# Patient Record
Sex: MALE | Race: WHITE | NOT HISPANIC OR LATINO | Employment: OTHER | URBAN - NONMETROPOLITAN AREA
[De-identification: names, ages, dates, MRNs, and addresses within clinical notes are randomized per-mention and may not be internally consistent; named-entity substitution may affect disease eponyms.]

---

## 2023-12-05 ENCOUNTER — HOSPITAL ENCOUNTER (EMERGENCY)
Facility: HOSPITAL | Age: 28
Discharge: HOME | End: 2023-12-05
Attending: EMERGENCY MEDICINE
Payer: MEDICAID

## 2023-12-05 VITALS
HEIGHT: 71 IN | RESPIRATION RATE: 16 BRPM | SYSTOLIC BLOOD PRESSURE: 150 MMHG | WEIGHT: 155 LBS | HEART RATE: 108 BPM | OXYGEN SATURATION: 96 % | TEMPERATURE: 98.1 F | BODY MASS INDEX: 21.7 KG/M2 | DIASTOLIC BLOOD PRESSURE: 96 MMHG

## 2023-12-05 DIAGNOSIS — K62.5 BRBPR (BRIGHT RED BLOOD PER RECTUM): Primary | ICD-10-CM

## 2023-12-05 PROCEDURE — 99281 EMR DPT VST MAYX REQ PHY/QHP: CPT | Performed by: EMERGENCY MEDICINE

## 2023-12-05 ASSESSMENT — COLUMBIA-SUICIDE SEVERITY RATING SCALE - C-SSRS
2. HAVE YOU ACTUALLY HAD ANY THOUGHTS OF KILLING YOURSELF?: NO
1. IN THE PAST MONTH, HAVE YOU WISHED YOU WERE DEAD OR WISHED YOU COULD GO TO SLEEP AND NOT WAKE UP?: NO
6. HAVE YOU EVER DONE ANYTHING, STARTED TO DO ANYTHING, OR PREPARED TO DO ANYTHING TO END YOUR LIFE?: NO

## 2023-12-05 ASSESSMENT — PAIN SCALES - GENERAL: PAINLEVEL_OUTOF10: 0 - NO PAIN

## 2023-12-05 ASSESSMENT — PAIN - FUNCTIONAL ASSESSMENT: PAIN_FUNCTIONAL_ASSESSMENT: 0-10

## 2023-12-05 NOTE — ED PROVIDER NOTES
HPI   Chief Complaint   Patient presents with    Rectal Bleeding     Pt states he had a bowel movement this morning and noticed blood in the toilet and on the paper when he wiped. Pt denies any pain and states that BM was not firm       HPI                    [place] Coma Scale Score: 15                  Patient History   No past medical history on file.  No past surgical history on file.  No family history on file.  Social History     Tobacco Use    Smoking status: Not on file    Smokeless tobacco: Not on file   Substance Use Topics    Alcohol use: Not on file    Drug use: Not on file       Physical Exam   ED Triage Vitals [12/05/23 0946]   Temp Heart Rate Resp BP   36.7 °C (98.1 °F) 108 16 (!) 150/96      SpO2 Temp Source Heart Rate Source Patient Position   96 % Temporal Monitor Sitting      BP Location FiO2 (%)     Right arm --       Physical Exam  Constitutional:       General: He is not in acute distress.     Appearance: Normal appearance. He is not toxic-appearing.   HENT:      Head: Normocephalic and atraumatic.      Right Ear: Tympanic membrane normal.      Left Ear: Tympanic membrane normal.      Mouth/Throat:      Mouth: Mucous membranes are moist.      Pharynx: Oropharynx is clear.   Eyes:      Conjunctiva/sclera: Conjunctivae normal.      Pupils: Pupils are equal, round, and reactive to light.   Cardiovascular:      Rate and Rhythm: Normal rate and regular rhythm.      Pulses: Normal pulses.      Heart sounds: Normal heart sounds.   Pulmonary:      Effort: Pulmonary effort is normal. No respiratory distress.      Breath sounds: Normal breath sounds. No wheezing.   Abdominal:      General: Bowel sounds are normal.      Palpations: Abdomen is soft.      Tenderness: There is no abdominal tenderness. There is no guarding or rebound.   Musculoskeletal:         General: Normal range of motion.      Cervical back: Normal range of motion.   Skin:     General: Skin is warm and dry.   Neurological:      General:  No focal deficit present.      Mental Status: He is alert and oriented to person, place, and time.         ED Course & MDM   Diagnoses as of 12/05/23 0956   BRBPR (bright red blood per rectum)       Medical Decision Making  28-year-old male presents to the ER with chief complaint of bright red blood per rectum.  Patient reports he has been having diarrhea for the past 4 to 5 days when he wiped he felt blood was very concerned and came to the ED.  Patient's vital signs are stable patient has no lightheadedness or dizziness.  No systemic symptoms.  I explained to the patient it is common to have bright red blood per rectum when you wipe and it is very rarely anything concerning.  Patient denies any rectal pain at all.  Patient be discharged home to follow-up with GI for further evaluation and potential endoscope.        Procedure  Procedures     Francesco Zuleta, DO  12/05/23 0956

## 2024-08-09 ENCOUNTER — HOSPITAL ENCOUNTER (EMERGENCY)
Facility: HOSPITAL | Age: 29
Discharge: HOME | End: 2024-08-09
Attending: EMERGENCY MEDICINE
Payer: MEDICAID

## 2024-08-09 VITALS
TEMPERATURE: 97.8 F | RESPIRATION RATE: 16 BRPM | HEART RATE: 83 BPM | DIASTOLIC BLOOD PRESSURE: 84 MMHG | BODY MASS INDEX: 22.4 KG/M2 | WEIGHT: 160 LBS | OXYGEN SATURATION: 98 % | HEIGHT: 71 IN | SYSTOLIC BLOOD PRESSURE: 143 MMHG

## 2024-08-09 DIAGNOSIS — K64.9 BLEEDING HEMORRHOID: Primary | ICD-10-CM

## 2024-08-09 PROCEDURE — 99281 EMR DPT VST MAYX REQ PHY/QHP: CPT

## 2024-08-09 ASSESSMENT — COLUMBIA-SUICIDE SEVERITY RATING SCALE - C-SSRS
2. HAVE YOU ACTUALLY HAD ANY THOUGHTS OF KILLING YOURSELF?: NO
6. HAVE YOU EVER DONE ANYTHING, STARTED TO DO ANYTHING, OR PREPARED TO DO ANYTHING TO END YOUR LIFE?: NO
1. IN THE PAST MONTH, HAVE YOU WISHED YOU WERE DEAD OR WISHED YOU COULD GO TO SLEEP AND NOT WAKE UP?: NO

## 2024-08-09 ASSESSMENT — PAIN - FUNCTIONAL ASSESSMENT
PAIN_FUNCTIONAL_ASSESSMENT: 0-10
PAIN_FUNCTIONAL_ASSESSMENT: 0-10

## 2024-08-09 ASSESSMENT — PAIN SCALES - GENERAL
PAINLEVEL_OUTOF10: 0 - NO PAIN
PAINLEVEL_OUTOF10: 0 - NO PAIN

## 2024-08-09 ASSESSMENT — PAIN DESCRIPTION - PROGRESSION: CLINICAL_PROGRESSION: NOT CHANGED

## 2024-08-09 NOTE — ED TRIAGE NOTES
Pt here with complaints of a hard ball on his butthole. Says he noticed it yesterday while in the shower and it has been bleeding. Denies pain.

## 2024-08-09 NOTE — Clinical Note
Bharathi Conway was seen and treated in our emergency department on 8/9/2024.  He may return to work on 08/10/2024.  No heavy lifting of anything over 10 lbs for the next 24 hours.     If you have any questions or concerns, please don't hesitate to call.      Nika MANLEY MD

## 2024-08-09 NOTE — ED PROVIDER NOTES
HPI   Chief Complaint   Patient presents with    Abscess     Pt here with complaints of a hard ball on his butthole. Says he noticed it yesterday while in the shower and it has been bleeding. Denies pain.       HPI  Patient is a 29-year-old male presenting to the ED today for bright red blood per rectum.  Patient notes that last night while in the shower, he noticed a lump in his rectum.  After having a bowel movement last night, he noticed bright red blood around his stool.  Since then, he states that he has continued to have bright red blood per rectum whenever he wipes with the toilet paper.  As he continued to have bright red blood this morning with wiping, he came to the ED for further evaluation.  He otherwise denies any associated pain.  He does report previous history of anal fissure.  He denies any dizziness, lightheadedness, or generalized weakness.  He is not on any anticoagulation and has no other past medical history.      Patient History   History reviewed. No pertinent past medical history.  History reviewed. No pertinent surgical history.  No family history on file.  Social History     Tobacco Use    Smoking status: Never    Smokeless tobacco: Never   Substance Use Topics    Alcohol use: Never    Drug use: Never       Physical Exam   ED Triage Vitals [08/09/24 0808]   Temperature Heart Rate Respirations BP   36.6 °C (97.8 °F) 83 16 143/84      Pulse Ox Temp Source Heart Rate Source Patient Position   98 % Temporal Monitor --      BP Location FiO2 (%)     -- --       Physical Exam  Vitals and nursing note reviewed.   Constitutional:       General: He is not in acute distress.     Appearance: He is not toxic-appearing.   HENT:      Head: Normocephalic.      Mouth/Throat:      Mouth: Mucous membranes are moist.   Eyes:      Extraocular Movements: Extraocular movements intact.      Conjunctiva/sclera: Conjunctivae normal.   Cardiovascular:      Rate and Rhythm: Normal rate and regular rhythm.    Pulmonary:      Effort: Pulmonary effort is normal. No respiratory distress.   Abdominal:      General: There is no distension.      Palpations: Abdomen is soft.      Tenderness: There is no abdominal tenderness.   Genitourinary:     Comments: Performed with DILCIA Meyers at bedside. External hemorrhoid present, no active bleeding at this time. Hemorrhoid not thrombosed  Musculoskeletal:         General: No swelling.      Cervical back: Neck supple.   Skin:     General: Skin is warm and dry.      Capillary Refill: Capillary refill takes less than 2 seconds.   Neurological:      General: No focal deficit present.      Mental Status: He is alert. Mental status is at baseline.           ED Course & MDM   Diagnoses as of 08/09/24 0832   Bleeding hemorrhoid             No data recorded     [place] Coma Scale Score: 15 (08/09/24 0820 : Mira Holt RN)                       Medical Decision Making  Patient is a 29-year-old male presenting to the ED today for bright red blood per rectum.  On exam, patient has an external hemorrhoid that is not currently actively bleeding.  Hemorrhoid itself is not thrombosed.  Patient is hemodynamically stable, not tachycardic or hypotensive.  He is not on any anticoagulation.  All questions and concerns were answered. Discharge planning with close outpatient follow-up was discussed at this time, to which the patient was agreeable. Patient is provided with PCP options as well as general surgery contact info for follow-up. Home care instructions and strict return precautions were given, and patient was discharged home in stable condition.    Procedure  Procedures     Nika MANLEY MD  08/09/24 08

## 2024-08-23 ENCOUNTER — PHARMACY VISIT (OUTPATIENT)
Dept: PHARMACY | Facility: CLINIC | Age: 29
End: 2024-08-23
Payer: MEDICAID

## 2024-08-23 ENCOUNTER — HOSPITAL ENCOUNTER (EMERGENCY)
Facility: HOSPITAL | Age: 29
Discharge: HOME | End: 2024-08-23
Payer: MEDICAID

## 2024-08-23 VITALS
RESPIRATION RATE: 18 BRPM | BODY MASS INDEX: 22.4 KG/M2 | TEMPERATURE: 97.3 F | WEIGHT: 160 LBS | HEART RATE: 94 BPM | OXYGEN SATURATION: 97 % | DIASTOLIC BLOOD PRESSURE: 79 MMHG | HEIGHT: 71 IN | SYSTOLIC BLOOD PRESSURE: 122 MMHG

## 2024-08-23 DIAGNOSIS — J06.9 UPPER RESPIRATORY TRACT INFECTION, UNSPECIFIED TYPE: Primary | ICD-10-CM

## 2024-08-23 LAB
FLUAV RNA RESP QL NAA+PROBE: NOT DETECTED
FLUBV RNA RESP QL NAA+PROBE: NOT DETECTED
SARS-COV-2 RNA RESP QL NAA+PROBE: NOT DETECTED

## 2024-08-23 PROCEDURE — 87636 SARSCOV2 & INF A&B AMP PRB: CPT | Performed by: PHYSICIAN ASSISTANT

## 2024-08-23 PROCEDURE — 99283 EMERGENCY DEPT VISIT LOW MDM: CPT

## 2024-08-23 PROCEDURE — RXMED WILLOW AMBULATORY MEDICATION CHARGE

## 2024-08-23 RX ORDER — MELOXICAM 7.5 MG/1
7.5 TABLET ORAL 2 TIMES DAILY
Qty: 20 TABLET | Refills: 0 | Status: SHIPPED | OUTPATIENT
Start: 2024-08-23 | End: 2024-09-02

## 2024-08-23 RX ORDER — METOCLOPRAMIDE 10 MG/1
10 TABLET ORAL 3 TIMES DAILY
Qty: 21 TABLET | Refills: 0 | Status: SHIPPED | OUTPATIENT
Start: 2024-08-23 | End: 2024-08-30

## 2024-08-23 RX ORDER — DIPHENHYDRAMINE HCL 25 MG
25 CAPSULE ORAL EVERY 8 HOURS PRN
Qty: 30 CAPSULE | Refills: 0 | Status: SHIPPED | OUTPATIENT
Start: 2024-08-23

## 2024-08-23 ASSESSMENT — PAIN DESCRIPTION - LOCATION: LOCATION: BACK

## 2024-08-23 ASSESSMENT — COLUMBIA-SUICIDE SEVERITY RATING SCALE - C-SSRS
1. IN THE PAST MONTH, HAVE YOU WISHED YOU WERE DEAD OR WISHED YOU COULD GO TO SLEEP AND NOT WAKE UP?: NO
6. HAVE YOU EVER DONE ANYTHING, STARTED TO DO ANYTHING, OR PREPARED TO DO ANYTHING TO END YOUR LIFE?: NO
2. HAVE YOU ACTUALLY HAD ANY THOUGHTS OF KILLING YOURSELF?: NO

## 2024-08-23 ASSESSMENT — PAIN DESCRIPTION - PAIN TYPE: TYPE: ACUTE PAIN

## 2024-08-23 ASSESSMENT — PAIN SCALES - GENERAL
PAINLEVEL_OUTOF10: 0 - NO PAIN
PAINLEVEL_OUTOF10: 0 - NO PAIN
PAINLEVEL_OUTOF10: 3

## 2024-08-23 ASSESSMENT — PAIN - FUNCTIONAL ASSESSMENT: PAIN_FUNCTIONAL_ASSESSMENT: 0-10

## 2024-08-23 NOTE — ED TRIAGE NOTES
Pt arrives to [facility] presenting with flu-like symptoms, pt states he has had cold sweats that have been ongoing for four days. Today, he took a home COVID test and it came back negative. Pt A&Ox4, resp easy and unlabored, skin of appropriate color.

## 2024-08-23 NOTE — ED PROVIDER NOTES
HPI   Chief Complaint   Patient presents with    Flu Symptoms     Pt arrives to [facility] presenting with flu-like symptoms, pt states he has had cold sweats that have been ongoing for four days. Today, he took a home COVID test and it came back negative. Pt A&Ox4, resp easy and unlabored, skin of appropriate color.        History of present illness:  29-year-old male presents emergency room for complaints of bodyaches, chills, cough and headache x 4 days.  Patient states he has felt this very tired and weak and been having the aforementioned symptoms the past 4 days.  He does not recall any sick contacts and states he has no previous medical history.  He denies any injuries or falls or trauma.  He states he feels like he has COVID but he took a home COVID test which was negative.  He states he has no other symptoms at this time and denies any other symptoms.  Upon further questioning he mentions he has had some bouts of diarrhea though and states has been very infrequent.  He denies any abdominal pain at this time or nausea or vomiting or difficulty eating or drinking.    Social history: Negative for alcohol and drug use.    Review of systems:   Gen.: No weight loss,  Eyes: No vision loss, double vision  ENT: No pharyngitis, neck pain  Cardiac: No chest pain, palpitations, syncope  Pulmonary: No shortness of breath,  hemoptysis.   Heme/lymph: No swollen glands, bleeding.   GI: No abdominal pain, change in bowel habits, melena, hematemesis, hematochezia, nausea, vomiting  : No discharge, dysuria, frequency, urgency, hematuria.   Musculoskeletal: No joint swelling.   Skin: No rashes.   Review of systems is otherwise negative unless stated above or in history of present illness.    Physical exam:  General: Vitals noted, no distress. Afebrile.   EENT: No lymphadenopathy appreciated   Cardiac: Regular, rate, rhythm, no murmur.   Pulmonary: Lungs clear bilaterally with good aeration. No adventitious breath sounds.   Abdomen:  Soft, nonsurgical. Nontender. No peritoneal signs. Normoactive bowel sounds.   Extremities: No peripheral edema.   Skin: No rash.   Neuro: No focal neurologic deficits      Medical decision making:   Testing: COVID testing influenza testing negative  Plan: Home-going.  Discussed differential. Will follow-up with the primary physician in the next 2-3 days. Return if worse. They understand return precautions and discharge instructions. Patient and family/friend/caregiver are in agreement with this plan. 29-year-old male presents emergency room for complaints of bodyaches, chills, cough and headache x 4 days.  Patient states he has felt this very tired and weak and been having the aforementioned symptoms the past 4 days.  He does not recall any sick contacts and states he has no previous medical history.  He denies any injuries or falls or trauma.  He states he feels like he has COVID but he took a home COVID test which was negative.  He states he has no other symptoms at this time and denies any other symptoms.  Upon further questioning he mentions he has had some bouts of diarrhea though and states has been very infrequent.  He denies any abdominal pain at this time or nausea or vomiting or difficulty eating or drinking. EENT: No lymphadenopathy appreciated   Cardiac: Regular, rate, rhythm, no murmur. Pulmonary: Lungs clear bilaterally with good aeration. No adventitious breath sounds.   Abdomen: Soft, nonsurgical. Nontender. No peritoneal signs. Normoactive bowel sounds.  I explained to the patient the test results at this time and offered him medications at this time.  The patient just wanted something for his headache and I explained to him I be happy send him home with meloxicam Reglan Benadryl typically alleviate some of his symptoms.  I encouraged him to follow-up with his primary care doctor and encouraged him return the event that any worsening symptoms.     Impression:   1.  Headache  2.   URI          History provided by:  Patient   used: No            Patient History   No past medical history on file.  No past surgical history on file.  No family history on file.  Social History     Tobacco Use    Smoking status: Never    Smokeless tobacco: Never   Substance Use Topics    Alcohol use: Never    Drug use: Never       Physical Exam   ED Triage Vitals [08/23/24 1340]   Temperature Heart Rate Respirations BP   36.3 °C (97.3 °F) 97 18 126/88      Pulse Ox Temp Source Heart Rate Source Patient Position   97 % Temporal Monitor --      BP Location FiO2 (%)     -- --       Physical Exam      ED Course & MDM   Diagnoses as of 08/23/24 1443   Upper respiratory tract infection, unspecified type                 No data recorded                                 Medical Decision Making      Procedure  Procedures     Bharathi Rucker PA-C  08/23/24 9584

## 2025-07-17 ENCOUNTER — PHARMACY VISIT (OUTPATIENT)
Dept: PHARMACY | Facility: CLINIC | Age: 30
End: 2025-07-17
Payer: MEDICAID

## 2025-07-17 ENCOUNTER — APPOINTMENT (OUTPATIENT)
Dept: CARDIOLOGY | Facility: HOSPITAL | Age: 30
End: 2025-07-17
Payer: MEDICAID

## 2025-07-17 ENCOUNTER — APPOINTMENT (OUTPATIENT)
Dept: RADIOLOGY | Facility: HOSPITAL | Age: 30
End: 2025-07-17
Payer: MEDICAID

## 2025-07-17 ENCOUNTER — HOSPITAL ENCOUNTER (EMERGENCY)
Facility: HOSPITAL | Age: 30
Discharge: HOME | End: 2025-07-17
Attending: EMERGENCY MEDICINE
Payer: MEDICAID

## 2025-07-17 VITALS
HEART RATE: 95 BPM | HEIGHT: 68 IN | BODY MASS INDEX: 25.01 KG/M2 | DIASTOLIC BLOOD PRESSURE: 70 MMHG | TEMPERATURE: 98.2 F | WEIGHT: 165 LBS | OXYGEN SATURATION: 100 % | SYSTOLIC BLOOD PRESSURE: 114 MMHG | RESPIRATION RATE: 15 BRPM

## 2025-07-17 DIAGNOSIS — R11.10 POST-TUSSIVE EMESIS: ICD-10-CM

## 2025-07-17 DIAGNOSIS — J20.9 ACUTE BRONCHITIS, UNSPECIFIED ORGANISM: Primary | ICD-10-CM

## 2025-07-17 DIAGNOSIS — R10.9 ABDOMINAL PAIN, UNSPECIFIED ABDOMINAL LOCATION: ICD-10-CM

## 2025-07-17 DIAGNOSIS — R05.1 ACUTE COUGH: ICD-10-CM

## 2025-07-17 LAB
ALBUMIN SERPL BCP-MCNC: 4.5 G/DL (ref 3.4–5)
ALP SERPL-CCNC: 76 U/L (ref 33–120)
ALT SERPL W P-5'-P-CCNC: 23 U/L (ref 10–52)
ANION GAP SERPL CALC-SCNC: 11 MMOL/L (ref 10–20)
AST SERPL W P-5'-P-CCNC: 24 U/L (ref 9–39)
ATRIAL RATE: 107 BPM
ATRIAL RATE: 99 BPM
BASOPHILS # BLD AUTO: 0.02 X10*3/UL (ref 0–0.1)
BASOPHILS NFR BLD AUTO: 0.2 %
BILIRUB SERPL-MCNC: 1.2 MG/DL (ref 0–1.2)
BUN SERPL-MCNC: 10 MG/DL (ref 6–23)
CALCIUM SERPL-MCNC: 9.3 MG/DL (ref 8.6–10.3)
CARDIAC TROPONIN I PNL SERPL HS: 3 NG/L (ref 0–20)
CARDIAC TROPONIN I PNL SERPL HS: 3 NG/L (ref 0–20)
CHLORIDE SERPL-SCNC: 107 MMOL/L (ref 98–107)
CO2 SERPL-SCNC: 27 MMOL/L (ref 21–32)
CREAT SERPL-MCNC: 0.97 MG/DL (ref 0.5–1.3)
D DIMER PPP FEU-MCNC: 272 NG/ML FEU
EGFRCR SERPLBLD CKD-EPI 2021: >90 ML/MIN/1.73M*2
EOSINOPHIL # BLD AUTO: 0.31 X10*3/UL (ref 0–0.7)
EOSINOPHIL NFR BLD AUTO: 3.1 %
ERYTHROCYTE [DISTWIDTH] IN BLOOD BY AUTOMATED COUNT: 12.2 % (ref 11.5–14.5)
FLUAV RNA RESP QL NAA+PROBE: NOT DETECTED
FLUBV RNA RESP QL NAA+PROBE: NOT DETECTED
GLUCOSE SERPL-MCNC: 95 MG/DL (ref 74–99)
HCT VFR BLD AUTO: 40 % (ref 41–52)
HGB BLD-MCNC: 13.8 G/DL (ref 13.5–17.5)
IMM GRANULOCYTES # BLD AUTO: 0.04 X10*3/UL (ref 0–0.7)
IMM GRANULOCYTES NFR BLD AUTO: 0.4 % (ref 0–0.9)
LIPASE SERPL-CCNC: 7 U/L (ref 9–82)
LYMPHOCYTES # BLD AUTO: 1.76 X10*3/UL (ref 1.2–4.8)
LYMPHOCYTES NFR BLD AUTO: 17.8 %
MCH RBC QN AUTO: 31 PG (ref 26–34)
MCHC RBC AUTO-ENTMCNC: 34.5 G/DL (ref 32–36)
MCV RBC AUTO: 90 FL (ref 80–100)
MONOCYTES # BLD AUTO: 0.66 X10*3/UL (ref 0.1–1)
MONOCYTES NFR BLD AUTO: 6.7 %
NEUTROPHILS # BLD AUTO: 7.1 X10*3/UL (ref 1.2–7.7)
NEUTROPHILS NFR BLD AUTO: 71.8 %
NRBC BLD-RTO: 0 /100 WBCS (ref 0–0)
P AXIS: 34 DEGREES
P AXIS: 46 DEGREES
P OFFSET: 190 MS
P OFFSET: 201 MS
P ONSET: 145 MS
P ONSET: 147 MS
PLATELET # BLD AUTO: 261 X10*3/UL (ref 150–450)
POTASSIUM SERPL-SCNC: 3.2 MMOL/L (ref 3.5–5.3)
PR INTERVAL: 142 MS
PR INTERVAL: 148 MS
PROT SERPL-MCNC: 7.3 G/DL (ref 6.4–8.2)
Q ONSET: 218 MS
Q ONSET: 219 MS
QRS COUNT: 17 BEATS
QRS COUNT: 17 BEATS
QRS DURATION: 94 MS
QRS DURATION: 98 MS
QT INTERVAL: 342 MS
QT INTERVAL: 344 MS
QTC CALCULATION(BAZETT): 438 MS
QTC CALCULATION(BAZETT): 459 MS
QTC FREDERICIA: 404 MS
QTC FREDERICIA: 417 MS
R AXIS: 44 DEGREES
R AXIS: 58 DEGREES
RBC # BLD AUTO: 4.45 X10*6/UL (ref 4.5–5.9)
RSV RNA RESP QL NAA+PROBE: NOT DETECTED
SARS-COV-2 RNA RESP QL NAA+PROBE: NOT DETECTED
SODIUM SERPL-SCNC: 142 MMOL/L (ref 136–145)
T AXIS: 36 DEGREES
T AXIS: 39 DEGREES
T OFFSET: 389 MS
T OFFSET: 391 MS
VENTRICULAR RATE: 107 BPM
VENTRICULAR RATE: 99 BPM
WBC # BLD AUTO: 9.9 X10*3/UL (ref 4.4–11.3)

## 2025-07-17 PROCEDURE — 80053 COMPREHEN METABOLIC PANEL: CPT | Performed by: EMERGENCY MEDICINE

## 2025-07-17 PROCEDURE — RXMED WILLOW AMBULATORY MEDICATION CHARGE

## 2025-07-17 PROCEDURE — 96360 HYDRATION IV INFUSION INIT: CPT

## 2025-07-17 PROCEDURE — 36415 COLL VENOUS BLD VENIPUNCTURE: CPT | Performed by: EMERGENCY MEDICINE

## 2025-07-17 PROCEDURE — 87637 SARSCOV2&INF A&B&RSV AMP PRB: CPT | Performed by: EMERGENCY MEDICINE

## 2025-07-17 PROCEDURE — 99285 EMERGENCY DEPT VISIT HI MDM: CPT | Mod: 25 | Performed by: EMERGENCY MEDICINE

## 2025-07-17 PROCEDURE — 71045 X-RAY EXAM CHEST 1 VIEW: CPT | Mod: FOREIGN READ | Performed by: RADIOLOGY

## 2025-07-17 PROCEDURE — 2500000004 HC RX 250 GENERAL PHARMACY W/ HCPCS (ALT 636 FOR OP/ED): Mod: SE | Performed by: EMERGENCY MEDICINE

## 2025-07-17 PROCEDURE — 84484 ASSAY OF TROPONIN QUANT: CPT | Performed by: EMERGENCY MEDICINE

## 2025-07-17 PROCEDURE — 96361 HYDRATE IV INFUSION ADD-ON: CPT

## 2025-07-17 PROCEDURE — 71045 X-RAY EXAM CHEST 1 VIEW: CPT

## 2025-07-17 PROCEDURE — 93005 ELECTROCARDIOGRAM TRACING: CPT

## 2025-07-17 PROCEDURE — 85379 FIBRIN DEGRADATION QUANT: CPT | Performed by: EMERGENCY MEDICINE

## 2025-07-17 PROCEDURE — 83690 ASSAY OF LIPASE: CPT | Performed by: EMERGENCY MEDICINE

## 2025-07-17 PROCEDURE — 93005 ELECTROCARDIOGRAM TRACING: CPT | Mod: 76

## 2025-07-17 PROCEDURE — 85025 COMPLETE CBC W/AUTO DIFF WBC: CPT | Performed by: EMERGENCY MEDICINE

## 2025-07-17 RX ORDER — ALBUTEROL SULFATE 90 UG/1
2 INHALANT RESPIRATORY (INHALATION) EVERY 4 HOURS PRN
Qty: 18 G | Refills: 0 | Status: SHIPPED | OUTPATIENT
Start: 2025-07-17

## 2025-07-17 RX ORDER — AZITHROMYCIN 250 MG/1
TABLET, FILM COATED ORAL
Qty: 6 TABLET | Refills: 0 | Status: SHIPPED | OUTPATIENT
Start: 2025-07-17

## 2025-07-17 RX ORDER — BROMPHENIRAMINE MALEATE, PSEUDOEPHEDRINE HYDROCHLORIDE, AND DEXTROMETHORPHAN HYDROBROMIDE 2; 30; 10 MG/5ML; MG/5ML; MG/5ML
5 SYRUP ORAL 4 TIMES DAILY PRN
Qty: 118 ML | Refills: 0 | Status: SHIPPED | OUTPATIENT
Start: 2025-07-17 | End: 2025-07-27

## 2025-07-17 RX ORDER — BENZONATATE 100 MG/1
100 CAPSULE ORAL 3 TIMES DAILY PRN
Qty: 30 CAPSULE | Refills: 0 | Status: SHIPPED | OUTPATIENT
Start: 2025-07-17

## 2025-07-17 RX ORDER — METHYLPREDNISOLONE 4 MG/1
TABLET ORAL
Qty: 21 TABLET | Refills: 0 | Status: SHIPPED | OUTPATIENT
Start: 2025-07-17 | End: 2025-07-23

## 2025-07-17 RX ADMIN — SODIUM CHLORIDE 1000 ML: 0.9 INJECTION, SOLUTION INTRAVENOUS at 12:03

## 2025-07-17 ASSESSMENT — PAIN DESCRIPTION - PAIN TYPE: TYPE: ACUTE PAIN

## 2025-07-17 ASSESSMENT — PAIN SCALES - GENERAL
PAINLEVEL_OUTOF10: 5 - MODERATE PAIN
PAINLEVEL_OUTOF10: 5 - MODERATE PAIN

## 2025-07-17 ASSESSMENT — PAIN DESCRIPTION - LOCATION: LOCATION: CHEST

## 2025-07-17 ASSESSMENT — PAIN DESCRIPTION - DESCRIPTORS
DESCRIPTORS: PRESSURE
DESCRIPTORS: PRESSURE

## 2025-07-17 ASSESSMENT — PAIN - FUNCTIONAL ASSESSMENT: PAIN_FUNCTIONAL_ASSESSMENT: 0-10

## 2025-07-17 NOTE — DISCHARGE INSTRUCTIONS
Start the antibiotics (Z-Destin/azithromycin) on 7/19 if your symptoms do not improve with the other medications.

## 2025-07-17 NOTE — ED TRIAGE NOTES
Pt ambulatory to ED c/o productive cough x3 days, pt states when he does cough his chest hurts and it goes into his stomach, states he vomited yesterday, endorses sore throat runny nose as well, pt is tachycardic as well

## 2025-07-17 NOTE — ED PROVIDER NOTES
HPI   Chief Complaint   Patient presents with    Cough    Abdominal Pain       30-year-old male presents for evaluation of cough, nausea, vomiting, abdominal pain.  For past 3 to 4 days he has had a cough with sputum production.  He has had episodes of hiccups.  He coughed so hard at times that he vomits.  He has abdominal pain only after the coughing spells.  No fevers.  Some sweats.      History provided by:  Patient and medical records          Patient History   Medical History[1]  Surgical History[2]  Family History[3]  Social History[4]    Physical Exam   ED Triage Vitals [07/17/25 1045]   Temperature Heart Rate Respirations BP   36.8 °C (98.2 °F) (!) 110 20 (!) 153/100      Pulse Ox Temp Source Heart Rate Source Patient Position   98 % Temporal -- --      BP Location FiO2 (%)     -- --       Physical Exam  Vitals and nursing note reviewed.   Constitutional:       Appearance: Normal appearance.   HENT:      Head: Normocephalic and atraumatic.      Right Ear: External ear normal.      Left Ear: External ear normal.      Nose: Nose normal.     Eyes:      Extraocular Movements: Extraocular movements intact.      Pupils: Pupils are equal, round, and reactive to light.       Cardiovascular:      Rate and Rhythm: Regular rhythm. Tachycardia present.   Pulmonary:      Effort: Pulmonary effort is normal.      Breath sounds: Normal breath sounds.   Abdominal:      Palpations: Abdomen is soft.      Tenderness: There is no abdominal tenderness.     Musculoskeletal:         General: No deformity. Normal range of motion.      Cervical back: Normal range of motion.     Skin:     General: Skin is warm and dry.     Neurological:      General: No focal deficit present.      Mental Status: He is alert and oriented to person, place, and time.      Cranial Nerves: No cranial nerve deficit.      Sensory: No sensory deficit.      Motor: No weakness.     Psychiatric:         Mood and Affect: Mood normal.           ED Course & MDM    ED Course as of 07/17/25 1714   Thu Jul 17, 2025   1152 21-year-old male presents with cough, nausea vomiting chest pain and abdominal pain after coughing.  Abdominal pain and vomiting most likely posttussive emesis.  He has a nontender abdomen and I do not see utility in checking abdominal imaging at this time.  Labs, saline bolus, serial EKGs and troponins. [BT]   1211 ECG 12 lead  EKG interpreted by me shows sinus tachycardia with rate of 107.  Normal axis.  Normal ST-T segments.  No acute injury pattern. [BT]   1211 ECG 12 lead  Repeat EKG interpreted by me shows sinus rhythm with rate of 99.  Normal axis.  Normal ST-T segments.  No acute injury pattern. [BT]   1713 D-Dimer, Quantitative VTE Exclusion: 272  D-dimer normal.  Pulmonary embolism excluded. [BT]   1713 POTASSIUM(!): 3.2  Noted.  Advised to increase dietary potassium intake and take potassium pills. [BT]   1713 Troponin I Series, High Sensitivity (0, 1 HR)  Negative troponin x 2.  EKG without acute injury pattern.  Doubt ACS. [BT]   1713 LIPASE(!): 7  Lipase normal.  No pancreatitis. [BT]   1713 XR chest 1 view  Chest x-ray with no acute findings.  No infiltrate or effusion. [BT]   1714 Most likely URI/bronchitis.  Prescribed Medrol pack albuterol Bromfed-DM and Tessalon.  Given wait and see prescription for Z-Destin.  Advised to start Z-Destin on 7/19 if symptoms do not improve with other treatment.  PCP follow-up.  Return in 12 hours or sooner with any concerns.  He agrees with this plan and verbalized understanding.  Discharged home. [BT]      ED Course User Index  [BT] Skyler Navarrete DO         Diagnoses as of 07/17/25 1714   Acute cough   Post-tussive emesis   Abdominal pain, unspecified abdominal location   Acute bronchitis, unspecified organism     XR chest 1 view   Final Result   No acute infiltrate or effusion.   Signed by Martín Joyner MD                    No data recorded     [place] Coma Scale Score: 15 (07/17/25 1116 : Mira Deng  DILCIA Barrera)                           Medical Decision Making      Procedure  Procedures       [1] History reviewed. No pertinent past medical history.  [2] History reviewed. No pertinent surgical history.  [3] No family history on file.  [4]   Social History  Tobacco Use    Smoking status: Never    Smokeless tobacco: Never   Substance Use Topics    Alcohol use: Never    Drug use: Never        Skyler Navarrete DO  07/17/25 1303

## 2025-08-08 LAB
ATRIAL RATE: 107 BPM
ATRIAL RATE: 99 BPM
P AXIS: 34 DEGREES
P AXIS: 46 DEGREES
P OFFSET: 190 MS
P OFFSET: 201 MS
P ONSET: 145 MS
P ONSET: 147 MS
PR INTERVAL: 142 MS
PR INTERVAL: 148 MS
Q ONSET: 218 MS
Q ONSET: 219 MS
QRS COUNT: 17 BEATS
QRS COUNT: 17 BEATS
QRS DURATION: 94 MS
QRS DURATION: 98 MS
QT INTERVAL: 342 MS
QT INTERVAL: 344 MS
QTC CALCULATION(BAZETT): 438 MS
QTC CALCULATION(BAZETT): 459 MS
QTC FREDERICIA: 404 MS
QTC FREDERICIA: 417 MS
R AXIS: 44 DEGREES
R AXIS: 58 DEGREES
T AXIS: 36 DEGREES
T AXIS: 39 DEGREES
T OFFSET: 389 MS
T OFFSET: 391 MS
VENTRICULAR RATE: 107 BPM
VENTRICULAR RATE: 99 BPM